# Patient Record
(demographics unavailable — no encounter records)

---

## 2025-01-14 NOTE — ADDENDUM
[FreeTextEntry1] :  NEETU PICKENS, am scribing for and in the presence of  in the following sections: HISTORY OF PRESENT ILLNESS, PAST MEDICAL/FAMILY/SOCIAL HISTORY, REVIEW OF SYSTEMS, VITAL SIGNS, PHYSICAL EXAM, ASSESSMENT/PLAN on 01/14/2025.

## 2025-01-14 NOTE — HISTORY OF PRESENT ILLNESS
[FreeTextEntry1] :  70 year old male patient seen in follow-up for overactive bladder, normal size prostate on ultrasound. Pt never got his Mirabegron and I am unsure why. He still complains of frequency and nocturia x3-5.

## 2025-01-14 NOTE — ASSESSMENT
[FreeTextEntry1] :  70 year old male patient with BPH, LUTS. I re-sent his prescription and advised him to let us know if he is unable to get it for any reason. He will follow-up 4-6 weeks after starting medications. I have educated patient on the side effects and asked him to monitor his blood pressure. All of the patient's questions were otherwise answered. Pt seen with KELLI Perry.   The submitted E/M billing level for this visit reflects the total time spent on the day of the visit including face-to-face time spent with the patient, non-face-to-face review of medical records and relevant information, documentation, and asynchronous communication with the patient after a visit via phone, email, or patients EHR portal after the visit. The medical records reviewed are either scanned into the chart or reviewed with the patient using a patients electronic medical records portal for patients with records not available to [facility] via electronic transmission platforms from other institutions and labs. Time spent counseling and performing coordination of care was also included in determining the appropriate EM billing level.   I have reviewed and verified information regarding the chief complaint and history recorded by the ancillary staff and/or the patient. I have independently reviewed and interpreted tests performed by other physicians and facilities as necessary.   I have discussed with the patient differential diagnosis, reason for auxiliary tests if ordered, risks, benefits, alternatives, and complications of each form of therapy were discussed.  I personally performed the services described in the documentation, reviewed the documentation recorded by the scribe in my presence, and it accurately and completely records my words and actions.

## 2025-03-20 NOTE — PHYSICAL EXAM
[General Appearance - Alert] : alert [General Appearance - In No Acute Distress] : in no acute distress [Delayed in the Right Toes] : capillary refills normal in right toes [Delayed in the Left Toes] : capillary refills normal in the left toes [2+] : left foot dorsalis pedis 2+ [FreeTextEntry3] : skin temperature gradient is WNL to bilateral lower extremities [] : normal strength/tone [de-identified] : ROM of ankle, subtalar, midtarsal, MPJ, IPJ are adequate and nontender bilateral 5/5 muscle power in inversion, eversion, dorsiflexion, plantarflexion bilateral [Skin Turgor] : normal skin turgor [Foot Ulcer] : no foot ulcer [Position Sense Dec.] : diminished position sense at the level of the toes [FreeTextEntry1] : gross epicritic sensation to feet diminished, light touch sensation intact, sharp/dull sensation intact [Oriented To Time, Place, And Person] : oriented to person, place, and time [Impaired Insight] : insight and judgment were intact [Affect] : the affect was normal

## 2025-03-20 NOTE — REVIEW OF SYSTEMS
[Joint Swelling] : no joint swelling [Joint Stiffness] : no joint stiffness [Limb Swelling] : no limb swelling [Skin Lesions] : skin lesion [Skin Wound] : no skin wound [Dry Skin] : dry skin [Confused] : no confusion [Convulsions] : no convulsions [Dizziness] : no dizziness [Fainting] : no fainting [Negative] : Psychiatric

## 2025-03-20 NOTE — HISTORY OF PRESENT ILLNESS
[FreeTextEntry1] : Mr. FLORIN CARNES is a 70 year male who is seen in the office for tinea pedis, onychomycosis. Patient denies any current or recent fever, chills, nausea, vomiting, SOB, or chest pain. AAOx3 and in NAD. Patient with hx of CKD.

## 2025-03-20 NOTE — PROCEDURE
[FreeTextEntry1] : 1) Onychomycosis with elongated toenails with pain/discomfort: aseptic debridement of elongated mycotic toenails x 8  Patient tolerated all treatments well and without any complications

## 2025-03-20 NOTE — ASSESSMENT
[FreeTextEntry1] : 1) Onychomycosis with elongated toenails with pain/discomfort: aseptic debridement of elongated mycotic toenails x 8 2) Tinea pedis: continue topical antifungal BID  Patient tolerated all treatments well and without any complications Follow up in 2 months [Verbal] : verbal [Patient] : patient [Good - alert, interested, motivated] : Good - alert, interested, motivated [Verbalizes knowledge/Understanding] : Verbalizes knowledge/understanding [Skin Care] : skin care [Pt responsibility to plan of care] : patient responsibility to plan of care

## 2025-03-21 NOTE — ASSESSMENT
[FreeTextEntry1] :  70 year old male patient with history of overactive bladder with reasonable result with Mirabegron. Unfortunately, he is having overage issues with insurance. We will try him on Gemtesa. He can follow-up in 3-4 months with KELLI Perry.   If he has any issues earlier, he will contact me. All of the patient's questions were otherwise answered. Pt seen with Dr. Aysha Calvo.    The submitted E/M billing level for this visit reflects the total time spent on the day of the visit including face-to-face time spent with the patient, non-face-to-face review of medical records and relevant information, documentation, and asynchronous communication with the patient after a visit via phone, email, or patients EHR portal after the visit. The medical records reviewed are either scanned into the chart or reviewed with the patient using a patients electronic medical records portal for patients with records not available to [facility] via electronic transmission platforms from other institutions and labs. Time spent counseling and performing coordination of care was also included in determining the appropriate EM billing level.   I have reviewed and verified information regarding the chief complaint and history recorded by the ancillary staff and/or the patient. I have independently reviewed and interpreted tests performed by other physicians and facilities as necessary.   I have discussed with the patient differential diagnosis, reason for auxiliary tests if ordered, risks, benefits, alternatives, and complications of each form of therapy were discussed.  I personally performed the services described in the documentation, reviewed the documentation recorded by the scribe in my presence, and it accurately and completely records my words and actions.

## 2025-03-21 NOTE — ADDENDUM
[FreeTextEntry1] :  NEETU PICKENS, am scribing for and in the presence of  in the following sections: HISTORY OF PRESENT ILLNESS, PAST MEDICAL/FAMILY/SOCIAL HISTORY, REVIEW OF SYSTEMS, VITAL SIGNS, PHYSICAL EXAM, ASSESSMENT/PLAN on 03/21/2025.

## 2025-03-21 NOTE — HISTORY OF PRESENT ILLNESS
[FreeTextEntry1] :  70 year old male patient seen for 6 week follow-up for overactive bladder. He was able to obtain Mirabegron 25 mg and started it. He has noticed a reduction in nocturia from baseline of 3-4x to 2-3x/night based on fluid intake, less urgency, no urge  incontinence. He is currently happy with medication however his insurance sent him a notice saying it is not in formulary and he would need pre-approval for the medication. His blood pressure today is a little elevated, but he did not take his medication this morning because he went to work. Overall, he is doing well.

## 2025-05-21 NOTE — HISTORY OF PRESENT ILLNESS
[FreeTextEntry1] : Mr. FLORIN CARNES is a 70 year male who is seen in the office for peripheral neuropathy with CKD, tinea pedis, onychomycosis. Patient denies any current or recent fever, chills, nausea, vomiting, SOB, or chest pain. AAOx3 and in NAD.

## 2025-05-21 NOTE — PROCEDURE
[FreeTextEntry1] : 1) Onychomycosis: aseptic debridement of thick/elongated mycotic toenails with pain/discomfort x 8 done 05/20/2025  Patient tolerated all treatments well and without any complications

## 2025-05-21 NOTE — ASSESSMENT
[FreeTextEntry1] : 1) Onychomycosis: aseptic debridement of thick/elongated mycotic toenails with pain/discomfort x 8 done 05/20/2025 2) Tinea pedis: recommend use of cotton socks and well ventilated shoe gear, recommend use of topical antifungal BID 3) Peripheral neuropathy affecting feet with CKD: recommend use of proper fit shoes with accommodative insoles, check feet for any new lesions daily, seek treatment immediately if present  Patient with Q9, class B and C findings of:   + decreased pedal hair growth + thickened toenail changes - skin discoloration + thin/shiny skin texture - skin rubor   - claudication - cool feet + edema + paresthesia - burning  Patient tolerated all treatments well and without any complications Follow up in 2 months  [Verbal] : verbal [Patient] : patient [Good - alert, interested, motivated] : Good - alert, interested, motivated [Verbalizes knowledge/Understanding] : Verbalizes knowledge/understanding [Pt responsibility to plan of care] : patient responsibility to plan of care

## 2025-05-21 NOTE — PHYSICAL EXAM
[General Appearance - Alert] : alert [General Appearance - In No Acute Distress] : in no acute distress [Delayed in the Right Toes] : capillary refills normal in right toes [Delayed in the Left Toes] : capillary refills normal in the left toes [2+] : left foot dorsalis pedis 2+ [FreeTextEntry3] : skin temperature gradient is WNL to bilateral lower extremities [] : normal strength/tone [de-identified] : ROM of ankle, subtalar, midtarsal, MPJ, IPJ are adequate bilateral 5/5 muscle power in inversion, eversion, dorsiflexion, plantarflexion bilateral [Skin Turgor] : normal skin turgor [Foot Ulcer] : no foot ulcer [Position Sense Dec.] : diminished position sense at the level of the toes [Diminished Throughout Right Foot] : diminished sensation with monofilament testing throughout right foot [Diminished Throughout Left Foot] : diminished sensation with monofilament testing throughout left foot [FreeTextEntry1] : gross epicritic sensation to feet diminished, light touch sensation diminished, sharp/dull sensation intact [Oriented To Time, Place, And Person] : oriented to person, place, and time [Impaired Insight] : insight and judgment were intact [Affect] : the affect was normal

## 2025-05-21 NOTE — REASON FOR VISIT
[Follow-Up Visit] : a follow-up visit for [FreeTextEntry2] : peripheral neuropathy with CKD, tinea pedis, onychomycosis

## 2025-07-16 NOTE — HISTORY OF PRESENT ILLNESS
[FreeTextEntry1] : 70-year-old male seen in follow-up with overactive bladder.  He was on Gemtesa 75 mg which did help however he ran into insurance issues again and has not been taking it.  While on the Gemtesa he had nocturia x 2 and his frequency was reasonable. No hematuria, dysuria, UTIs.

## 2025-07-16 NOTE — ASSESSMENT
[FreeTextEntry1] : 70-year-old male with bladder overactivity who was doing well while on Gemtesa 75 mg.  I sent a new prescription to [facility] and if he has issues with the insurance coverage he will let me know.  We will obtain a PSA today.  If this is stable then he will follow-up in 3 to 4 months.  All of his questions were otherwise answered.  Patient discussed with KELLI Perry. Agree. No new issues.

## 2025-07-23 NOTE — ASSESSMENT
[FreeTextEntry1] : 1) Onychomycosis with onychodystrophy/onychogryphosis: aseptic debridement of thick/elongated, incurvated, mycotic and dystrophic toenails with pain/discomfort x 8 was done 07/21/2025 2) Callus/corns: aseptic debridement and paring of painful foot callus/corns x 2 was done 07/21/2025 3) Tinea pedis: recommend use of cotton socks and well ventilated shoe gear, recommend use of topical antifungal BID 4) Peripheral neuropathy: recommend use of proper fit shoes with accommodative insoles, check feet for any new lesions daily, seek treatment immediately if present 5) CKD: protective sensation diminished to feet, recommended patient to use proper fit shoes with accommodative insoles/orthotics, check feet daily for any new lesions and seek immediate care if present  Patient with Q9, class B and C findings of:  + decreased pedal hair growth + thickened toenail changes - skin discoloration + thin/shiny skin texture - skin rubor  - claudication - cool feet + edema + paresthesia - burning   Patient tolerated all treatments well and without any complications Follow up in 2.5 months     [Verbal] : verbal [Patient] : patient [Good - alert, interested, motivated] : Good - alert, interested, motivated [Verbalizes knowledge/Understanding] : Verbalizes knowledge/understanding [Pt responsibility to plan of care] : patient responsibility to plan of care

## 2025-07-23 NOTE — PHYSICAL EXAM
[General Appearance - Alert] : alert [General Appearance - In No Acute Distress] : in no acute distress [Delayed in the Right Toes] : capillary refills normal in right toes [Delayed in the Left Toes] : capillary refills normal in the left toes [2+] : left foot dorsalis pedis 2+ [FreeTextEntry3] : skin temperature gradient is WNL to bilateral lower extremities [] : normal strength/tone [de-identified] : ROM of ankle, subtalar, midtarsal, MPJ, IPJ are adequate bilateral 5/5 muscle power in inversion, eversion, dorsiflexion, plantarflexion bilateral [Skin Turgor] : normal skin turgor [Foot Ulcer] : no foot ulcer [Position Sense Dec.] : diminished position sense at the level of the toes [Diminished Throughout Right Foot] : diminished sensation with monofilament testing throughout right foot [Diminished Throughout Left Foot] : diminished sensation with monofilament testing throughout left foot [FreeTextEntry1] : gross epicritic sensation to feet diminished, light touch sensation diminished, sharp/dull sensation intact [Oriented To Time, Place, And Person] : oriented to person, place, and time [Impaired Insight] : insight and judgment were intact [Affect] : the affect was normal

## 2025-07-23 NOTE — HISTORY OF PRESENT ILLNESS
[FreeTextEntry1] : Mr. FLORIN CARNES is a 70 year male who is seen in the office for hx of CKD, peripheral neuropathy, tinea pedis, onychomycosis, calluses. Patient denies any current or recent fever, chills, nausea, vomiting, SOB, or chest pain. AAOx3 and in NAD.

## 2025-07-23 NOTE — PROCEDURE
[FreeTextEntry1] : 1) Onychomycosis with onychodystrophy/onychogryphosis: aseptic debridement of thick/elongated, incurvated, mycotic and dystrophic toenails with pain/discomfort x 8 was done 07/21/2025 2) Callus/corns: aseptic debridement and paring of painful foot callus/corns x 2 was done 07/21/2025    Patient tolerated all treatments well and without any complications